# Patient Record
Sex: MALE
[De-identification: names, ages, dates, MRNs, and addresses within clinical notes are randomized per-mention and may not be internally consistent; named-entity substitution may affect disease eponyms.]

---

## 2022-01-07 ENCOUNTER — HOSPITAL ENCOUNTER (EMERGENCY)
Dept: HOSPITAL 43 - DL.ED | Age: 22
Discharge: HOME | End: 2022-01-07
Payer: MEDICAID

## 2022-01-07 DIAGNOSIS — Z20.822: ICD-10-CM

## 2022-01-07 DIAGNOSIS — Z72.0: ICD-10-CM

## 2022-01-07 DIAGNOSIS — G43.909: Primary | ICD-10-CM

## 2022-01-07 DIAGNOSIS — B34.9: ICD-10-CM

## 2022-01-07 LAB
ANION GAP SERPL CALC-SCNC: 14.1 MEQ/L (ref 7–13)
CHLORIDE SERPL-SCNC: 99 MMOL/L (ref 98–107)
SARS-COV-2 RNA RESP QL NAA+PROBE: NEGATIVE
SODIUM SERPL-SCNC: 139 MMOL/L (ref 136–145)

## 2022-01-07 PROCEDURE — 36415 COLL VENOUS BLD VENIPUNCTURE: CPT

## 2022-01-07 PROCEDURE — 81003 URINALYSIS AUTO W/O SCOPE: CPT

## 2022-01-07 PROCEDURE — 0240U: CPT

## 2022-01-07 PROCEDURE — 87040 BLOOD CULTURE FOR BACTERIA: CPT

## 2022-01-07 PROCEDURE — 99283 EMERGENCY DEPT VISIT LOW MDM: CPT

## 2022-01-07 PROCEDURE — 96374 THER/PROPH/DIAG INJ IV PUSH: CPT

## 2022-01-07 PROCEDURE — 85025 COMPLETE CBC W/AUTO DIFF WBC: CPT

## 2022-01-07 PROCEDURE — 80053 COMPREHEN METABOLIC PANEL: CPT

## 2022-01-07 PROCEDURE — 83605 ASSAY OF LACTIC ACID: CPT

## 2022-01-07 NOTE — EDM.PDOC
ED HPI GENERAL MEDICAL PROBLEM





- General


Chief Complaint: Headache


Stated Complaint: CHILLS,HEADACHE,THROWING UP-[id number]


Time Seen by Provider: 01/07/22 21:40


Source of Information: Reports: Patient


History Limitations: Reports: No Limitations





- History of Present Illness


INITIAL COMMENTS - FREE TEXT/NARRATIVE: 





This 22 yo male patient reports to the ED with generalized body aches throughout

the day and a worsening headache. The patient reports he did vomit earlier 

today. The patient reports he has not taken anything for temporary symptom 

relief. The patient reports he does have a history of migraine headaches in high

school. 


Onset: Today


Duration: Constant


Location: Reports: Head, Generalized


Quality: Reports: Ache


Severity: Moderate


Improves with: Reports: None


Worsens with: Reports: None


Context: Reports: Other


Associated Symptoms: Reports: No Other Symptoms


  ** .frontal headache.


Pain Score (Numeric/FACES): 5





- Related Data


                                    Allergies











Allergy/AdvReac Type Severity Reaction Status Date / Time


 


No Known Allergies Allergy   Verified 01/07/22 20:53











Home Meds: 


                                    Home Meds





. [No Known Home Meds]  01/07/22 [History]











Past Medical History





- Past Health History


Medical/Surgical History: Denies Medical/Surgical History


Neurological History: Reports: Migraines





- Past Surgical History


Musculoskeletal Surgical History: Reports: None





Social & Family History





- Family History


Family Medical History: No Pertinent Family History





- Tobacco Use


Packs/Tins Daily: 0.1


Second Hand Smoke Exposure: No





- Recreational Drug Use


Recreational Drug Use: No





ED ROS GENERAL





- Review of Systems


Review Of Systems: Comprehensive ROS is negative, except as noted in HPI.





ED EXAM, GENERAL





- Physical Exam


Exam: See Below


General Appearance: Alert, WD/WN, Moderate Distress


Eye Exam: Bilateral Eye: EOMI, Normal Inspection, PERRL


Ears: Normal External Exam, Normal Canal, Hearing Grossly Normal, Normal TMs


Nose: Normal Inspection, Normal Mucosa, No Blood


Throat/Mouth: Normal Inspection, Normal Lips, Normal Teeth, Normal Gums, Normal 

Oropharynx, Normal Voice, No Airway Compromise


Head: Atraumatic, Normocephalic


Neck: Normal Inspection, Supple, Non-Tender, Full Range of Motion


Respiratory/Chest: No Respiratory Distress, Lungs Clear, Normal Breath Sounds, 

No Accessory Muscle Use, Chest Non-Tender


Cardiovascular: Normal Peripheral Pulses, Regular Rate, Rhythm, No Edema, No 

Gallop, No JVD, No Murmur, No Rub


GI/Abdominal: Normal Bowel Sounds, Soft, Non-Tender, No Organomegaly, No 

Distention, No Abnormal Bruit, No Mass


 (Male) Exam: Deferred


Rectal (Males) Exam: Deferred


Back Exam: Normal Inspection, Full Range of Motion, NT


Extremities: Normal Inspection, Normal Range of Motion, Non-Tender, Normal 

Capillary Refill, No Pedal Edema


Neurological: Alert, Oriented, CN II-XII Intact, Normal Cognition, Normal Gait, 

Normal Reflexes, No Motor/Sensory Deficits


Psychiatric: Normal Affect, Normal Mood


Skin Exam: Warm, Dry, Intact, Normal Color, No Rash


Lymphatic: No Adenopathy





Course





- Vital Signs


Last Recorded V/S: 


                                Last Vital Signs











Temp  97.9 F   01/07/22 20:48


 


Pulse  90   01/07/22 20:48


 


Resp  16   01/07/22 20:48


 


BP  112/77   01/07/22 20:48


 


Pulse Ox  100   01/07/22 20:48














- Orders/Labs/Meds


Orders: 


                               Active Orders 24 hr











 Category Date Time Status


 


 CULTURE BLOOD [BC] Stat Lab  01/07/22 21:44 Ordered


 


 Sodium Chloride 0.9% [Normal Saline] 1,000 ml Med  01/07/22 21:44 Ordered





 IV .BOLUS   








                                Medication Orders





Sodium Chloride (Normal Saline)  1,000 mls @ 999 mls/hr IV .BOLUS ONE


   Stop: 01/07/22 22:44


   Last Admin: 01/07/22 21:53  Dose: 999 mls/hr


   Documented by: HERVE








Labs: 


                                Laboratory Tests











  01/07/22 01/07/22 01/07/22 Range/Units





  20:47 21:49 21:50 


 


WBC    14.8 H  (5.0-10.0)  10^3/uL


 


RBC    5.17  (4.6-6.2)  10^6/uL


 


Hgb    15.1  (14.0-18.0)  g/dL


 


Hct    44.9  (40.0-54.0)  %


 


MCV    86.8  ()  fL


 


MCH    29.2  (27.0-34.0)  pg


 


MCHC    33.6  (33.0-35.0)  g/dL


 


Plt Count    330  (150-450)  10^3/uL


 


Neut % (Auto)    69.6  (42.2-75.2)  %


 


Lymph % (Auto)    22.3  (20.5-50.1)  %


 


Mono % (Auto)    7.7  (2-8)  %


 


Eos % (Auto)    0.2 L  (1.0-3.0)  %


 


Baso % (Auto)    0.2  (0.0-1.0)  %


 


Sodium     (136-145)  mmol/L


 


Potassium     (3.5-5.1)  mmol/L


 


Chloride     ()  mmol/L


 


Carbon Dioxide     (21-32)  mmol/L


 


Anion Gap     (7-13)  mEq/L


 


BUN     (7-18)  mg/dL


 


Creatinine     (0.70-1.30)  mg/dL


 


Est Cr Clr Drug Dosing     


 


Estimated GFR (MDRD)     


 


BUN/Creatinine Ratio     (No establ ref range)  


 


Glucose     (70-99)  mg/dL


 


Lactic Acid     (0.4-2.0)  mmol/L


 


Calcium     (8.5-10.1)  mg/dL


 


Total Bilirubin     (0.2-1.0)  mg/dL


 


AST     (15-37)  U/L


 


ALT     (16-63)  U/L


 


Alkaline Phosphatase     ()  U/L


 


Total Protein     (6.4-8.2)  g/dL


 


Albumin     (3.4-5.0)  g/dL


 


Globulin     


 


Albumin/Globulin Ratio     


 


Urine Color   Yellow   (YELLOW)  


 


Urine Appearance   Clear   (CLEAR)  


 


Urine pH   6.0   (5.0-9.0)  


 


Ur Specific Gravity   >= 1.030   (1.005-1.030)  


 


Urine Protein   Negative   (NEGATIVE)  


 


Urine Glucose (UA)   Negative   (NEGATIVE)  


 


Urine Ketones   Negative   (NEGATIVE)  


 


Urine Occult Blood   Negative   (NEGATIVE)  


 


Urine Nitrite   Negative   (NEGATIVE)  


 


Urine Bilirubin   Negative   (NEGATIVE)  


 


Urine Urobilinogen   0.2   (0.2-1.0)  mg/dL


 


Ur Leukocyte Esterase   Negative   (NEGATIVE)  


 


Influenza Type A RNA  Negative    (NEGATIVE)  


 


Influenza Type B RNA  Negative    (NEGATIVE)  


 


SARS-CoV-2 RNA (DEMETRIO)  Negative    (NEGATIVE)  














  01/07/22 01/07/22 Range/Units





  21:50 21:50 


 


WBC    (5.0-10.0)  10^3/uL


 


RBC    (4.6-6.2)  10^6/uL


 


Hgb    (14.0-18.0)  g/dL


 


Hct    (40.0-54.0)  %


 


MCV    ()  fL


 


MCH    (27.0-34.0)  pg


 


MCHC    (33.0-35.0)  g/dL


 


Plt Count    (150-450)  10^3/uL


 


Neut % (Auto)    (42.2-75.2)  %


 


Lymph % (Auto)    (20.5-50.1)  %


 


Mono % (Auto)    (2-8)  %


 


Eos % (Auto)    (1.0-3.0)  %


 


Baso % (Auto)    (0.0-1.0)  %


 


Sodium  139   (136-145)  mmol/L


 


Potassium  3.1 L   (3.5-5.1)  mmol/L


 


Chloride  99   ()  mmol/L


 


Carbon Dioxide  29   (21-32)  mmol/L


 


Anion Gap  14.1 H   (7-13)  mEq/L


 


BUN  16   (7-18)  mg/dL


 


Creatinine  1.05   (0.70-1.30)  mg/dL


 


Est Cr Clr Drug Dosing  TNP   


 


Estimated GFR (MDRD)  > 60   


 


BUN/Creatinine Ratio  15.2   (No establ ref range)  


 


Glucose  92   (70-99)  mg/dL


 


Lactic Acid   1.6  (0.4-2.0)  mmol/L


 


Calcium  9.3   (8.5-10.1)  mg/dL


 


Total Bilirubin  0.4   (0.2-1.0)  mg/dL


 


AST  18   (15-37)  U/L


 


ALT  41   (16-63)  U/L


 


Alkaline Phosphatase  110   ()  U/L


 


Total Protein  8.9 H   (6.4-8.2)  g/dL


 


Albumin  4.4   (3.4-5.0)  g/dL


 


Globulin  4.5   


 


Albumin/Globulin Ratio  1.0   


 


Urine Color    (YELLOW)  


 


Urine Appearance    (CLEAR)  


 


Urine pH    (5.0-9.0)  


 


Ur Specific Gravity    (1.005-1.030)  


 


Urine Protein    (NEGATIVE)  


 


Urine Glucose (UA)    (NEGATIVE)  


 


Urine Ketones    (NEGATIVE)  


 


Urine Occult Blood    (NEGATIVE)  


 


Urine Nitrite    (NEGATIVE)  


 


Urine Bilirubin    (NEGATIVE)  


 


Urine Urobilinogen    (0.2-1.0)  mg/dL


 


Ur Leukocyte Esterase    (NEGATIVE)  


 


Influenza Type A RNA    (NEGATIVE)  


 


Influenza Type B RNA    (NEGATIVE)  


 


SARS-CoV-2 RNA (DEMETRIO)    (NEGATIVE)  











Meds: 


Medications











Generic Name Dose Route Start Last Admin





  Trade Name Freq  PRN Reason Stop Dose Admin


 


Sodium Chloride  1,000 mls @ 999 mls/hr  01/07/22 21:44  01/07/22 21:53





  Normal Saline  IV  01/07/22 22:44  999 mls/hr





  .BOLUS ONE   Administration














Discontinued Medications














Generic Name Dose Route Start Last Admin





  Trade Name Avel  PRN Reason Stop Dose Admin


 


Ketorolac Tromethamine  30 mg  01/07/22 21:44  01/07/22 21:54





  Ketorolac 30 Mg/Ml Sdv  IVPUSH  01/07/22 21:45  30 mg





  ONETIME ONE   Administration














Departure





- Departure


Time of Disposition: 22:31


Disposition: Home, Self-Care 01


Condition: Fair


Clinical Impression: 


 Migraine, Viral illness








- Discharge Information


*PRESCRIPTION DRUG MONITORING PROGRAM REVIEWED*: Not Applicable


*COPY OF PRESCRIPTION DRUG MONITORING REPORT IN PATIENT SERGE: Not Applicable


Instructions:  Migraine Headache, Easy-to-Read, Viral Illness, Adult


Forms:  ED Department Discharge


Care Plan Goals: 


The patient was advised of the examination and lab results during the visit. The

patient was given a liter of IV fluids and an IV dose of Toradol while in the ED

with symptom relief. The patient was encouraged to take Tylenol or ibuprofen as 

directed for temporary symptom relief. The patient tested negative for COVID, 

Influenza A and Influenza B. If the patient has any additional symptoms or 

concerns, the patient should either return to the emergency department or visit 

his primary care facility. 





Sepsis Event Note (ED)





- Evaluation


Sepsis Screening Result: No Definite Risk





- Focused Exam


Vital Signs: 


                                   Vital Signs











  Temp Pulse Resp BP Pulse Ox


 


 01/07/22 20:48  97.9 F  90  16  112/77  100














- My Orders


Last 24 Hours: 


My Active Orders





01/07/22 21:44


CULTURE BLOOD [BC] Stat 


Sodium Chloride 0.9% [Normal Saline] 1,000 ml IV .BOLUS 














- Assessment/Plan


Last 24 Hours: 


My Active Orders





01/07/22 21:44


CULTURE BLOOD [BC] Stat 


Sodium Chloride 0.9% [Normal Saline] 1,000 ml IV .BOLUS